# Patient Record
Sex: FEMALE | Race: WHITE | Employment: PART TIME | URBAN - METROPOLITAN AREA
[De-identification: names, ages, dates, MRNs, and addresses within clinical notes are randomized per-mention and may not be internally consistent; named-entity substitution may affect disease eponyms.]

---

## 2017-02-01 ENCOUNTER — OFFICE VISIT (OUTPATIENT)
Dept: FAMILY MEDICINE CLINIC | Age: 27
End: 2017-02-01

## 2017-02-01 VITALS
WEIGHT: 121.4 LBS | SYSTOLIC BLOOD PRESSURE: 118 MMHG | TEMPERATURE: 98.3 F | DIASTOLIC BLOOD PRESSURE: 80 MMHG | HEIGHT: 59 IN | BODY MASS INDEX: 24.48 KG/M2

## 2017-02-01 DIAGNOSIS — J40 BRONCHITIS: Primary | ICD-10-CM

## 2017-02-01 DIAGNOSIS — H61.21 IMPACTED CERUMEN OF RIGHT EAR: ICD-10-CM

## 2017-02-01 PROCEDURE — 99214 OFFICE O/P EST MOD 30 MIN: CPT | Performed by: FAMILY MEDICINE

## 2017-02-01 PROCEDURE — 69210 REMOVE IMPACTED EAR WAX UNI: CPT | Performed by: FAMILY MEDICINE

## 2017-02-01 RX ORDER — METHYLPREDNISOLONE 4 MG/1
TABLET ORAL
Qty: 1 KIT | Refills: 0 | Status: SHIPPED | OUTPATIENT
Start: 2017-02-01 | End: 2017-02-07

## 2017-02-01 RX ORDER — AZITHROMYCIN 250 MG/1
TABLET, FILM COATED ORAL
Qty: 1 PACKET | Refills: 0 | Status: SHIPPED | OUTPATIENT
Start: 2017-02-01 | End: 2017-02-11

## 2017-02-01 ASSESSMENT — ENCOUNTER SYMPTOMS
COUGH: 1
SORE THROAT: 0
HEARTBURN: 0
WHEEZING: 1
RHINORRHEA: 0
HEMOPTYSIS: 0
SHORTNESS OF BREATH: 1

## 2018-03-20 ENCOUNTER — TELEPHONE (OUTPATIENT)
Dept: FAMILY MEDICINE CLINIC | Age: 28
End: 2018-03-20

## 2018-03-22 ENCOUNTER — OFFICE VISIT (OUTPATIENT)
Dept: FAMILY MEDICINE CLINIC | Age: 28
End: 2018-03-22

## 2018-03-22 VITALS
SYSTOLIC BLOOD PRESSURE: 120 MMHG | HEIGHT: 59 IN | BODY MASS INDEX: 24.6 KG/M2 | DIASTOLIC BLOOD PRESSURE: 74 MMHG | WEIGHT: 122 LBS | TEMPERATURE: 98.4 F

## 2018-03-22 DIAGNOSIS — N83.201 CYSTS OF BOTH OVARIES: ICD-10-CM

## 2018-03-22 DIAGNOSIS — N83.202 CYSTS OF BOTH OVARIES: ICD-10-CM

## 2018-03-22 DIAGNOSIS — F41.1 GAD (GENERALIZED ANXIETY DISORDER): ICD-10-CM

## 2018-03-22 DIAGNOSIS — R10.9 ABDOMINAL PAIN, UNSPECIFIED ABDOMINAL LOCATION: Primary | ICD-10-CM

## 2018-03-22 DIAGNOSIS — N94.6 DYSMENORRHEA: ICD-10-CM

## 2018-03-22 LAB
BILIRUBIN, POC: ABNORMAL
BLOOD URINE, POC: ABNORMAL
CLARITY, POC: ABNORMAL
COLOR, POC: YELLOW
GLUCOSE URINE, POC: ABNORMAL
KETONES, POC: ABNORMAL
LEUKOCYTE EST, POC: ABNORMAL
NITRITE, POC: ABNORMAL
PH, POC: 6.5
PROTEIN, POC: ABNORMAL
SPECIFIC GRAVITY, POC: 1.02
UROBILINOGEN, POC: 0.2

## 2018-03-22 PROCEDURE — G8427 DOCREV CUR MEDS BY ELIG CLIN: HCPCS | Performed by: FAMILY MEDICINE

## 2018-03-22 PROCEDURE — 99214 OFFICE O/P EST MOD 30 MIN: CPT | Performed by: FAMILY MEDICINE

## 2018-03-22 PROCEDURE — 1036F TOBACCO NON-USER: CPT | Performed by: FAMILY MEDICINE

## 2018-03-22 PROCEDURE — G8484 FLU IMMUNIZE NO ADMIN: HCPCS | Performed by: FAMILY MEDICINE

## 2018-03-22 PROCEDURE — 81002 URINALYSIS NONAUTO W/O SCOPE: CPT | Performed by: FAMILY MEDICINE

## 2018-03-22 PROCEDURE — G8420 CALC BMI NORM PARAMETERS: HCPCS | Performed by: FAMILY MEDICINE

## 2018-03-22 RX ORDER — PAROXETINE 10 MG/1
10 TABLET, FILM COATED ORAL DAILY
Qty: 30 TABLET | Refills: 3 | Status: SHIPPED | OUTPATIENT
Start: 2018-03-22 | End: 2019-03-01 | Stop reason: SDUPTHER

## 2018-03-22 ASSESSMENT — PATIENT HEALTH QUESTIONNAIRE - PHQ9
SUM OF ALL RESPONSES TO PHQ QUESTIONS 1-9: 2
2. FEELING DOWN, DEPRESSED OR HOPELESS: 1
1. LITTLE INTEREST OR PLEASURE IN DOING THINGS: 1
SUM OF ALL RESPONSES TO PHQ9 QUESTIONS 1 & 2: 2

## 2018-03-22 ASSESSMENT — ENCOUNTER SYMPTOMS
RESPIRATORY NEGATIVE: 1
ABDOMINAL PAIN: 1

## 2018-04-09 ENCOUNTER — HOSPITAL ENCOUNTER (OUTPATIENT)
Dept: ULTRASOUND IMAGING | Age: 28
Discharge: HOME | End: 2018-04-09
Attending: FAMILY MEDICINE | Admitting: FAMILY MEDICINE

## 2018-04-09 DIAGNOSIS — N83.202 CYSTS OF BOTH OVARIES: ICD-10-CM

## 2018-04-09 DIAGNOSIS — N94.6 DYSMENORRHEA: ICD-10-CM

## 2018-04-09 DIAGNOSIS — N83.201 CYSTS OF BOTH OVARIES: ICD-10-CM

## 2019-03-01 ENCOUNTER — TELEPHONE (OUTPATIENT)
Dept: FAMILY MEDICINE CLINIC | Age: 29
End: 2019-03-01

## 2019-03-01 DIAGNOSIS — F41.1 GAD (GENERALIZED ANXIETY DISORDER): ICD-10-CM

## 2019-03-01 RX ORDER — PAROXETINE 10 MG/1
10 TABLET, FILM COATED ORAL DAILY
Qty: 30 TABLET | Refills: 3 | Status: SHIPPED | OUTPATIENT
Start: 2019-03-01 | End: 2019-11-08 | Stop reason: SDUPTHER

## 2019-08-01 ENCOUNTER — OFFICE VISIT (OUTPATIENT)
Dept: FAMILY MEDICINE CLINIC | Age: 29
End: 2019-08-01
Payer: COMMERCIAL

## 2019-08-01 VITALS
BODY MASS INDEX: 27.3 KG/M2 | HEART RATE: 80 BPM | TEMPERATURE: 98.1 F | DIASTOLIC BLOOD PRESSURE: 81 MMHG | SYSTOLIC BLOOD PRESSURE: 117 MMHG | HEIGHT: 59 IN | WEIGHT: 135.4 LBS

## 2019-08-01 DIAGNOSIS — H66.004 RECURRENT ACUTE SUPPURATIVE OTITIS MEDIA OF RIGHT EAR WITHOUT SPONTANEOUS RUPTURE OF TYMPANIC MEMBRANE: Primary | ICD-10-CM

## 2019-08-01 PROCEDURE — 99213 OFFICE O/P EST LOW 20 MIN: CPT | Performed by: FAMILY MEDICINE

## 2019-08-01 PROCEDURE — 1036F TOBACCO NON-USER: CPT | Performed by: FAMILY MEDICINE

## 2019-08-01 PROCEDURE — G8419 CALC BMI OUT NRM PARAM NOF/U: HCPCS | Performed by: FAMILY MEDICINE

## 2019-08-01 PROCEDURE — G8427 DOCREV CUR MEDS BY ELIG CLIN: HCPCS | Performed by: FAMILY MEDICINE

## 2019-08-01 RX ORDER — AMOXICILLIN AND CLAVULANATE POTASSIUM 875; 125 MG/1; MG/1
1 TABLET, FILM COATED ORAL 2 TIMES DAILY
Qty: 14 TABLET | Refills: 0 | Status: SHIPPED | OUTPATIENT
Start: 2019-08-01 | End: 2019-08-08

## 2019-08-01 ASSESSMENT — ENCOUNTER SYMPTOMS
TROUBLE SWALLOWING: 0
SINUS PRESSURE: 0
DIARRHEA: 0
CHEST TIGHTNESS: 0
COUGH: 0
ABDOMINAL PAIN: 0
NAUSEA: 0
SORE THROAT: 0
RHINORRHEA: 0
SHORTNESS OF BREATH: 0
VOMITING: 0
WHEEZING: 0
FACIAL SWELLING: 0

## 2019-11-08 ENCOUNTER — OFFICE VISIT (OUTPATIENT)
Dept: FAMILY MEDICINE CLINIC | Age: 29
End: 2019-11-08
Payer: COMMERCIAL

## 2019-11-08 VITALS
WEIGHT: 137 LBS | OXYGEN SATURATION: 99 % | BODY MASS INDEX: 27.67 KG/M2 | HEART RATE: 78 BPM | SYSTOLIC BLOOD PRESSURE: 110 MMHG | DIASTOLIC BLOOD PRESSURE: 78 MMHG

## 2019-11-08 DIAGNOSIS — F41.1 GAD (GENERALIZED ANXIETY DISORDER): ICD-10-CM

## 2019-11-08 PROCEDURE — G8484 FLU IMMUNIZE NO ADMIN: HCPCS | Performed by: FAMILY MEDICINE

## 2019-11-08 PROCEDURE — G8419 CALC BMI OUT NRM PARAM NOF/U: HCPCS | Performed by: FAMILY MEDICINE

## 2019-11-08 PROCEDURE — 96160 PT-FOCUSED HLTH RISK ASSMT: CPT | Performed by: FAMILY MEDICINE

## 2019-11-08 PROCEDURE — 99213 OFFICE O/P EST LOW 20 MIN: CPT | Performed by: FAMILY MEDICINE

## 2019-11-08 PROCEDURE — G8427 DOCREV CUR MEDS BY ELIG CLIN: HCPCS | Performed by: FAMILY MEDICINE

## 2019-11-08 PROCEDURE — 1036F TOBACCO NON-USER: CPT | Performed by: FAMILY MEDICINE

## 2019-11-08 RX ORDER — TRAZODONE HYDROCHLORIDE 50 MG/1
50 TABLET ORAL NIGHTLY
Qty: 30 TABLET | Refills: 2 | Status: SHIPPED | OUTPATIENT
Start: 2019-11-08 | End: 2020-06-10 | Stop reason: SDUPTHER

## 2019-11-08 RX ORDER — PAROXETINE 10 MG/1
10 TABLET, FILM COATED ORAL DAILY
Qty: 30 TABLET | Refills: 3 | Status: SHIPPED | OUTPATIENT
Start: 2019-11-08 | End: 2020-06-10 | Stop reason: SDUPTHER

## 2019-11-08 ASSESSMENT — COLUMBIA-SUICIDE SEVERITY RATING SCALE - C-SSRS
7. DID THIS OCCUR IN THE LAST THREE MONTHS: OVER A YEAR AGO?
2. HAVE YOU ACTUALLY HAD ANY THOUGHTS OF KILLING YOURSELF?: NO
1. WITHIN THE PAST MONTH, HAVE YOU WISHED YOU WERE DEAD OR WISHED YOU COULD GO TO SLEEP AND NOT WAKE UP?: YES
6. HAVE YOU EVER DONE ANYTHING, STARTED TO DO ANYTHING, OR PREPARED TO DO ANYTHING TO END YOUR LIFE?: YES

## 2019-11-08 ASSESSMENT — ENCOUNTER SYMPTOMS
GASTROINTESTINAL NEGATIVE: 1
RESPIRATORY NEGATIVE: 1

## 2019-11-08 ASSESSMENT — PATIENT HEALTH QUESTIONNAIRE - PHQ9
4. FEELING TIRED OR HAVING LITTLE ENERGY: 3
10. IF YOU CHECKED OFF ANY PROBLEMS, HOW DIFFICULT HAVE THESE PROBLEMS MADE IT FOR YOU TO DO YOUR WORK, TAKE CARE OF THINGS AT HOME, OR GET ALONG WITH OTHER PEOPLE: 2
SUM OF ALL RESPONSES TO PHQ9 QUESTIONS 1 & 2: 6
8. MOVING OR SPEAKING SO SLOWLY THAT OTHER PEOPLE COULD HAVE NOTICED. OR THE OPPOSITE, BEING SO FIGETY OR RESTLESS THAT YOU HAVE BEEN MOVING AROUND A LOT MORE THAN USUAL: 3
3. TROUBLE FALLING OR STAYING ASLEEP: 3
SUM OF ALL RESPONSES TO PHQ QUESTIONS 1-9: 25
7. TROUBLE CONCENTRATING ON THINGS, SUCH AS READING THE NEWSPAPER OR WATCHING TELEVISION: 3
9. THOUGHTS THAT YOU WOULD BE BETTER OFF DEAD, OR OF HURTING YOURSELF: 3
SUM OF ALL RESPONSES TO PHQ QUESTIONS 1-9: 25
6. FEELING BAD ABOUT YOURSELF - OR THAT YOU ARE A FAILURE OR HAVE LET YOURSELF OR YOUR FAMILY DOWN: 1
1. LITTLE INTEREST OR PLEASURE IN DOING THINGS: 3
DEPRESSION UNABLE TO ASSESS: FUNCTIONAL CAPACITY MOTIVATION LIMITS ACCURACY
2. FEELING DOWN, DEPRESSED OR HOPELESS: 3
5. POOR APPETITE OR OVEREATING: 3

## 2020-06-10 ENCOUNTER — VIRTUAL VISIT (OUTPATIENT)
Dept: FAMILY MEDICINE CLINIC | Age: 30
End: 2020-06-10
Payer: COMMERCIAL

## 2020-06-10 PROCEDURE — 99213 OFFICE O/P EST LOW 20 MIN: CPT | Performed by: FAMILY MEDICINE

## 2020-06-10 PROCEDURE — G8428 CUR MEDS NOT DOCUMENT: HCPCS | Performed by: FAMILY MEDICINE

## 2020-06-10 RX ORDER — AMOXICILLIN 500 MG/1
500 CAPSULE ORAL 2 TIMES DAILY
Qty: 14 CAPSULE | Refills: 0 | Status: SHIPPED | OUTPATIENT
Start: 2020-06-10 | End: 2020-06-17

## 2020-06-10 RX ORDER — TRAZODONE HYDROCHLORIDE 50 MG/1
75 TABLET ORAL NIGHTLY
Qty: 45 TABLET | Refills: 2 | Status: SHIPPED | OUTPATIENT
Start: 2020-06-10 | End: 2021-11-23

## 2020-06-10 RX ORDER — PAROXETINE 10 MG/1
10 TABLET, FILM COATED ORAL DAILY
Qty: 30 TABLET | Refills: 5 | Status: SHIPPED | OUTPATIENT
Start: 2020-06-10 | End: 2020-10-09 | Stop reason: SDUPTHER

## 2020-06-10 ASSESSMENT — ENCOUNTER SYMPTOMS
SORE THROAT: 1
GASTROINTESTINAL NEGATIVE: 1
RESPIRATORY NEGATIVE: 1

## 2020-06-10 NOTE — PROGRESS NOTES
Pulse oximetry-     Constitutional: [x] Appears well-developed and well-nourished [x] No apparent distress      [] Abnormal-   Mental status  [x] Alert and awake  [x] Oriented to person/place/time [x]Able to follow commands      Eyes:  EOM    []  Normal  [] Abnormal-  Sclera  []  Normal  [] Abnormal -         Discharge []  None visible  [] Abnormal -    HENT:   [x] Normocephalic, atraumatic. [] Abnormal   [] Mouth/Throat: Mucous membranes are moist.     External Ears [x] Normal  [] Abnormal-     Neck: [] No visualized mass     Pulmonary/Chest: [x] Respiratory effort normal.  [x] No visualized signs of difficulty breathing or respiratory distress        [] Abnormal-      Musculoskeletal:   [] Normal gait with no signs of ataxia         [] Normal range of motion of neck        [] Abnormal-       Neurological:        [x] No Facial Asymmetry (Cranial nerve 7 motor function) (limited exam to video visit)          [x] No gaze palsy        [] Abnormal-         Skin:        [x] No significant exanthematous lesions or discoloration noted on facial skin         [] Abnormal-            Psychiatric:       [x] Normal Affect [x] No Hallucinations        [] Abnormal-     Other pertinent observable physical exam findings-     ASSESSMENT/PLAN:  Assessment/plan;  Diagnoses and all orders for this visit:    Cervical adenopathy  -     amoxicillin (AMOXIL) 500 MG capsule; Take 1 capsule by mouth 2 times daily for 7 days    GUANAKO (generalized anxiety disorder)  -     PARoxetine (PAXIL) 10 MG tablet; Take 1 tablet by mouth daily  -     traZODone (DESYREL) 50 MG tablet; Take 1.5 tablets by mouth nightly      No follow-ups on file. Maverick Bose is a 35 y.o. female being evaluated by a Virtual Visit (video visit) encounter to address concerns as mentioned above. A caregiver was present when appropriate.  Due to this being a TeleHealth encounter ([facility]), evaluation of the following organ systems was

## 2020-06-22 ENCOUNTER — OFFICE VISIT (OUTPATIENT)
Dept: PRIMARY CARE CLINIC | Age: 30
End: 2020-06-22
Payer: COMMERCIAL

## 2020-06-22 VITALS — HEART RATE: 78 BPM | OXYGEN SATURATION: 99 % | TEMPERATURE: 98.4 F

## 2020-06-22 PROCEDURE — 99213 OFFICE O/P EST LOW 20 MIN: CPT | Performed by: INTERNAL MEDICINE

## 2020-06-22 PROCEDURE — 1036F TOBACCO NON-USER: CPT | Performed by: INTERNAL MEDICINE

## 2020-06-22 PROCEDURE — G8419 CALC BMI OUT NRM PARAM NOF/U: HCPCS | Performed by: INTERNAL MEDICINE

## 2020-06-22 PROCEDURE — G8428 CUR MEDS NOT DOCUMENT: HCPCS | Performed by: INTERNAL MEDICINE

## 2020-06-24 LAB
SARS-COV-2: NOT DETECTED
SOURCE: NORMAL

## 2020-09-08 ENCOUNTER — OFFICE VISIT (OUTPATIENT)
Dept: PRIMARY CARE CLINIC | Age: 30
End: 2020-09-08
Payer: COMMERCIAL

## 2020-09-08 PROCEDURE — G8419 CALC BMI OUT NRM PARAM NOF/U: HCPCS | Performed by: NURSE PRACTITIONER

## 2020-09-08 PROCEDURE — G8428 CUR MEDS NOT DOCUMENT: HCPCS | Performed by: NURSE PRACTITIONER

## 2020-09-08 PROCEDURE — 99211 OFF/OP EST MAY X REQ PHY/QHP: CPT | Performed by: NURSE PRACTITIONER

## 2020-09-08 NOTE — PROGRESS NOTES
Lillian Benedict received a viral test for COVID-19. They were educated on isolation and quarantine as appropriate. For any symptoms, they were directed to seek care from their PCP, given contact information to establish with a doctor, directed to an urgent care or the emergency room.

## 2020-09-08 NOTE — PATIENT INSTRUCTIONS
You have received a viral test for COVID-19. Below is education on quarantine per the CDC guidelines. For any symptoms, seek care from your PCP, call [phone] to establish care with a doctor, or go directly to an urgent care or the emergency room. Test results will take 2-7 days and will be sent to you in your [facility] account. If you test positive, you will be contacted via phone. If you test negative, the ONLY communication will be through [address] GO TO [employer] AND SIGN UP FOR [employer] [facility]  (LOWER LEFT OF THE HOME PAGE)  No test is 100%. If you have symptoms, you should follow the guidance of quarantine as previously stated. You can still be contagious if you have symptoms. Your [facility] will reach out to you if you have a positive result. They will provide you with a return to work date and note. If you were tested for a pre-op, then you should remain in quarantine until your procedure. How do I know if I need to be in quarantine? If you live in a community where COVID-19 is or might be spreading (currently, that is virtually everywhere in the [place])  Be alert for symptoms. Watch for fever, cough, shortness of breath, or other symptoms of COVID-19.  Take your temperature if symptoms develop.  Practice social distancing. Maintain 6 feet of distance from others and stay out of crowded places.  Follow CDC guidance if symptoms develop. If you feel healthy but:   Recently had close contact with a person with COVID-19 you need to Quarantine:   Stay home until 14 days after your last exposure.  Check your temperature twice a day and watch for symptoms of COVID-19.  If possible, stay away from people who are at higher-risk for getting very sick from COVID-19.   Stay Home and Monitor Your Health if you:   Have been diagnosed with COVID-19, or   Are waiting for test results, or   Have cough, fever, or shortness of breath, or symptoms of COVID-19      When You Can

## 2020-09-10 ENCOUNTER — PATIENT MESSAGE (OUTPATIENT)
Dept: FAMILY MEDICINE CLINIC | Age: 30
End: 2020-09-10

## 2020-09-10 ENCOUNTER — NURSE TRIAGE (OUTPATIENT)
Dept: OTHER | Facility: CLINIC | Age: 30
End: 2020-09-10

## 2020-09-10 LAB — SARS-COV-2, NAA: NOT DETECTED

## 2020-09-10 RX ORDER — AMOXICILLIN 500 MG/1
500 CAPSULE ORAL 2 TIMES DAILY
Qty: 14 CAPSULE | Refills: 0 | Status: SHIPPED | OUTPATIENT
Start: 2020-09-10 | End: 2020-09-17

## 2020-09-10 NOTE — TELEPHONE ENCOUNTER
Reason for Disposition   Caller has cancelled the call before the first contact    Protocols used: NO CONTACT OR DUPLICATE CONTACT CALL-ADULT-[place]

## 2020-09-10 NOTE — TELEPHONE ENCOUNTER
From: Efraín Nair  To: Rosey Zepeda DO  Sent: 9/10/2020 10:38 AM EDT  Subject: Prescription Question    Good morning,     Tried to get an appointment and none were available until the 22 or 23 of September. My Covid results came back negative. I've had a sore throat since Saturday and was wondering if you could send a prescription in for me so I don't have to wait that long.      Thanks,    Braydon Tobar

## 2020-10-09 RX ORDER — PAROXETINE 10 MG/1
10 TABLET, FILM COATED ORAL DAILY
Qty: 30 TABLET | Refills: 5 | Status: SHIPPED | OUTPATIENT
Start: 2020-10-09 | End: 2021-11-23

## 2021-03-10 ENCOUNTER — TELEPHONE (OUTPATIENT)
Dept: FAMILY MEDICINE CLINIC | Age: 31
End: 2021-03-10

## 2021-03-10 ENCOUNTER — NURSE ONLY (OUTPATIENT)
Dept: FAMILY MEDICINE CLINIC | Age: 31
End: 2021-03-10
Payer: COMMERCIAL

## 2021-03-10 DIAGNOSIS — R30.0 DYSURIA: Primary | ICD-10-CM

## 2021-03-10 LAB
BILIRUBIN, POC: ABNORMAL
BLOOD URINE, POC: ABNORMAL
CLARITY, POC: ABNORMAL
COLOR, POC: ABNORMAL
GLUCOSE URINE, POC: ABNORMAL
KETONES, POC: ABNORMAL
LEUKOCYTE EST, POC: ABNORMAL
NITRITE, POC: ABNORMAL
PH, POC: 6
PROTEIN, POC: ABNORMAL
SPECIFIC GRAVITY, POC: 1.02
UROBILINOGEN, POC: 0.32

## 2021-03-10 PROCEDURE — 81002 URINALYSIS NONAUTO W/O SCOPE: CPT | Performed by: FAMILY MEDICINE

## 2021-03-10 RX ORDER — CIPROFLOXACIN 500 MG/1
500 TABLET, FILM COATED ORAL 2 TIMES DAILY
Qty: 10 TABLET | Refills: 0 | Status: SHIPPED | OUTPATIENT
Start: 2021-03-10 | End: 2021-03-15

## 2021-03-10 NOTE — TELEPHONE ENCOUNTER
Patient is calling stating she thinks she may have a UTI she states she having frequent urination and a little pain while urinating.  Symptoms started this morning can she come in today around 4:00 for a urine specimen please advise

## 2021-03-13 LAB
ORGANISM: ABNORMAL
URINE CULTURE, ROUTINE: ABNORMAL

## 2021-11-02 ENCOUNTER — NURSE ONLY (OUTPATIENT)
Dept: FAMILY MEDICINE CLINIC | Age: 31
End: 2021-11-02
Payer: COMMERCIAL

## 2021-11-02 ENCOUNTER — TELEPHONE (OUTPATIENT)
Dept: FAMILY MEDICINE CLINIC | Age: 31
End: 2021-11-02

## 2021-11-02 DIAGNOSIS — R39.9 UTI SYMPTOMS: Primary | ICD-10-CM

## 2021-11-02 LAB
BILIRUBIN, POC: NEGATIVE
BLOOD URINE, POC: NORMAL
CLARITY, POC: NORMAL
COLOR, POC: YELLOW
GLUCOSE URINE, POC: NEGATIVE
KETONES, POC: NEGATIVE
LEUKOCYTE EST, POC: NEGATIVE
NITRITE, POC: NEGATIVE
PH, POC: 7.5
PROTEIN, POC: NEGATIVE
SPECIFIC GRAVITY, POC: 1.02
UROBILINOGEN, POC: 0.2

## 2021-11-02 PROCEDURE — 81002 URINALYSIS NONAUTO W/O SCOPE: CPT | Performed by: FAMILY MEDICINE

## 2021-11-02 NOTE — TELEPHONE ENCOUNTER
Pt came in  on wrong day for her appt that was scheduled on Nov 9. Pt left a  Urine sample  today.   Left her  appt on the schedule for Nov 9th for a follow up.      [phone]

## 2021-11-03 LAB — URINE CULTURE, ROUTINE: NORMAL

## 2021-11-03 RX ORDER — NITROFURANTOIN 25; 75 MG/1; MG/1
100 CAPSULE ORAL 2 TIMES DAILY
Qty: 10 CAPSULE | Refills: 0 | Status: SHIPPED | OUTPATIENT
Start: 2021-11-03 | End: 2021-11-08

## 2021-11-11 LAB
HPV COMMENT: NORMAL
HPV TYPE 16: NOT DETECTED
HPV TYPE 18: NOT DETECTED
HPVOH (OTHER TYPES): NOT DETECTED

## 2021-11-23 ENCOUNTER — APPOINTMENT (OUTPATIENT)
Dept: GENERAL RADIOLOGY | Age: 31
End: 2021-11-23
Payer: COMMERCIAL

## 2021-11-23 ENCOUNTER — HOSPITAL ENCOUNTER (EMERGENCY)
Age: 31
Discharge: HOME | End: 2021-11-23
Payer: COMMERCIAL

## 2021-11-23 VITALS
TEMPERATURE: 98.5 F | HEART RATE: 66 BPM | BODY MASS INDEX: 30.24 KG/M2 | HEIGHT: 59 IN | RESPIRATION RATE: 14 BRPM | OXYGEN SATURATION: 98 % | WEIGHT: 150 LBS | SYSTOLIC BLOOD PRESSURE: 112 MMHG | DIASTOLIC BLOOD PRESSURE: 77 MMHG

## 2021-11-23 DIAGNOSIS — S63.652A SPRAIN OF METACARPOPHALANGEAL (MCP) JOINT OF RIGHT MIDDLE FINGER, INITIAL ENCOUNTER: Primary | ICD-10-CM

## 2021-11-23 PROCEDURE — 73130 X-RAY EXAM OF HAND: CPT

## 2021-11-23 PROCEDURE — 99283 EMERGENCY DEPT VISIT LOW MDM: CPT

## 2021-11-23 ASSESSMENT — ENCOUNTER SYMPTOMS
VOMITING: 0
NAUSEA: 0

## 2021-11-23 ASSESSMENT — PAIN SCALES - GENERAL: PAINLEVEL_OUTOF10: 4

## 2021-11-23 NOTE — ED PROVIDER NOTES
[facility]        Pt Name: Rick Wong  MRN: [id number]  [place] 1990  Date of evaluation: 11/23/2021  Provider: Ernesto Montero PA-C  PCP: Neita Meckel, DO  Note Started: 3:28 PM EST       YON. I have evaluated this patient. My supervising physician was available for consultation. CHIEF COMPLAINT       Chief Complaint   Patient presents with    Finger Pain     Pt reports right middle finger pain x 1 week, no injury. HISTORY OF PRESENT ILLNESS   (Location, Timing/Onset, Context/Setting, Quality, Duration, Modifying Factors, Severity, Associated Signs and Symptoms)  Note limiting factors. Chief Complaint: Right hand pain    Rick Wong is a 28 y.o. female who presents to the emergency department today for evaluation for right hand pain. The patient states that she has been experiencing pain to her right hand, mostly her right middle finger, and she states that this has been present for 1 week. The patient states that she first noticed this 1 week ago while trying to open a jar of jalapenos. The patient denies falling or injuring herself in any way. She states that she does work as a /, is unsure if this could be contributing as well. The patient is currently rating her discomfort as a 4/10, her pain is worse with touch and certain movements. She is right-handed. She has no numbness, tingling or weakness. She has no fever chills. No nausea or vomiting, no other complaints. Nursing Notes were all reviewed and agreed with or any disagreements were addressed in the HPI. REVIEW OF SYSTEMS    (2-9 systems for level 4, 10 or more for level 5)     Review of Systems   Constitutional: Negative for activity change, appetite change and fever. Gastrointestinal: Negative for nausea and vomiting. Musculoskeletal: Positive for arthralgias. Skin: Negative for wound.    Neurological: Negative for weakness and numbness. Positives and Pertinent negatives as per HPI. Except as noted above in the ROS, all other systems were reviewed and negative. PAST MEDICAL HISTORY     Past Medical History:   Diagnosis Date    Asthma     used inhaler 10/25/10    Rh incompatibility          SURGICAL HISTORY     Past Surgical History:   Procedure Laterality Date    APPENDECTOMY  1996    TONSILLECTOMY  2008         [address]       Discharge Medication List as of 11/23/2021  4:19 PM      CONTINUE these medications which have NOT CHANGED    Details   ibuprofen (IBU) 800 MG tablet Take 1 tablet by mouth every 8 hours as needed for Pain., Disp-40 tablet, R-0               ALLERGIES     Patient has no known allergies. FAMILYHISTORY       Family History   Problem Relation Age of Onset    Asthma Mother     Sickle Cell Trait Daughter     Sickle Cell Trait Son     Asthma Maternal Grandmother           SOCIAL HISTORY       Social History     Tobacco Use    Smoking status: Never Smoker    Smokeless tobacco: Never Used    Tobacco comment: encouraged to never smoke   Substance Use Topics    Alcohol use: No     Alcohol/week: 0.0 standard drinks    Drug use: No       SCREENINGS             PHYSICAL EXAM    (up to 7 for level 4, 8 or more for level 5)     ED Triage Vitals   BP Temp Temp src Pulse Resp SpO2 Height Weight   11/23/21 1500 11/23/21 1500 -- 11/23/21 1500 11/23/21 1500 11/23/21 1500 11/23/21 1456 11/23/21 1456   112/77 98.5 °F (36.9 °C)  66 14 98 % 4' 11\" (1.499 m) 150 lb (68 kg)       Physical Exam  Vitals and nursing note reviewed. Constitutional:       Appearance: She is well-developed. She is not diaphoretic. HENT:      Head: Normocephalic and atraumatic. Right Ear: External ear normal.      Left Ear: External ear normal.      Nose: Nose normal.   Eyes:      General:         Right eye: No discharge. Left eye: No discharge. Neck:      Trachea: No tracheal deviation. Cardiovascular:      Pulses: Normal pulses. Pulmonary:      Effort: Pulmonary effort is normal. No respiratory distress. Musculoskeletal:         General: Normal range of motion. Cervical back: Normal range of motion and neck supple. Comments: There is tenderness palpation noted over the dorsum of the right hand, increasing over the right third metacarpal, extending to the proximal phalanx. There is no overlying erythema, edema, ecchymosis or warmth. Radial pulses 2+. Normal sensation to light touch. Neurovascularly intact. Full range of motion of all joints. Skin:     General: Skin is warm and dry. Neurological:      Mental Status: She is alert and oriented to person, place, and time. Psychiatric:         Behavior: Behavior normal.         DIAGNOSTIC RESULTS   LABS:    Labs Reviewed - No data to display    When ordered only abnormal lab results are displayed. All other labs were within normal range or not returned as of this dictation. EKG: When ordered, EKG's are interpreted by the Emergency Department Physician in the absence of a cardiologist.  Please see their note for interpretation of EKG. RADIOLOGY:   Non-plain film images such as CT, Ultrasound and MRI are read by the radiologist. Plain radiographic images are visualized and preliminarily interpreted by the ED Provider with the below findings:        Interpretation per the Radiologist below, if available at the time of this note:    XR HAND RIGHT (MIN 3 VIEWS)   Final Result   No acute osseous abnormality of the right hand. No results found.         PROCEDURES   Unless otherwise noted below, none     Procedures    CRITICAL CARE TIME   N/A    CONSULTS:  None      EMERGENCY DEPARTMENT COURSE and DIFFERENTIAL DIAGNOSIS/MDM:   Vitals:    Vitals:    11/23/21 1456 11/23/21 1500   BP:  112/77   Pulse:  66   Resp:  14   Temp:  98.5 °F (36.9 °C)   SpO2:  98%   Weight: 150 lb (68 kg)    Height: 4' 11\" (1.499 m)        Patient was given the following medications:  Medications - No data to display        Briefly, this is a 21-year-old female who presents emergency department today for evaluation for right hand pain, and swelling. Patient states that he first noticed this 1 week ago while opening a jar of jalapenos. He does work as a / and is unsure if this could be the source of her pain    On examination she does have tenderness under the dorsum of the right hand, increasing over the right third metacarpal extending to the proximal phalanx she is otherwise neurovascularly intact    X-rays negative for any acute process. I feel that clinically the patient symptoms today likely due to sprain/strain, supportive care discussed at home, recommend follow-up with her primary care physician within 1 week if there is no improvement. She is to return to the ED for any new or worsening symptoms, the patient voiced understanding and is agreeable with plan. Stable for discharge. No results found for this visit on 11/23/21. I estimate there is LOW risk for COMPARTMENT SYNDROME, DEEP VENOUS THROMBOSIS, SEPTIC ARTHRITIS, TENDON OR NEUROVASCULAR INJURY, thus I consider the discharge disposition reasonable. Lexi Irwin and I have discussed the diagnosis and risks, and we agree with discharging home to follow-up with their primary doctor or the referral orthopedist. We also discussed returning to the Emergency Department immediately if new or worsening symptoms occur. We have discussed the symptoms which are most concerning (e.g., changing or worsening pain, numbness, weakness) that necessitate immediate return. FINAL IMPRESSION      1.  Sprain of metacarpophalangeal (MCP) joint of right middle finger, initial encounter          DISPOSITION/PLAN   DISPOSITION Decision To Discharge 11/23/2021 04:17:58 PM      PATIENT REFERRED TO:  Brandi Ingram DO  [address] [place] [id number]  [phone]    Schedule an appointment as soon as possible for a visit in 1 week      [facility] Emergency Department  [address] [facility]  [phone]    As needed, If symptoms worsen      DISCHARGE MEDICATIONS:  Discharge Medication List as of 11/23/2021  4:19 PM          DISCONTINUED MEDICATIONS:  Discharge Medication List as of 11/23/2021  4:19 PM      STOP taking these medications       PARoxetine (PAXIL) 10 MG tablet Comments:   Reason for Stopping:         traZODone (DESYREL) 50 MG tablet Comments:   Reason for Stopping:                      (Please note that portions of this note were completed with a voice recognition program.  Efforts were made to edit the dictations but occasionally words are mis-transcribed.)    Jocelyne Muhammad PA-C (electronically signed)            Jocelyne Muhammad PA-C  11/23/21 4623

## 2022-06-09 ENCOUNTER — E-VISIT (OUTPATIENT)
Dept: FAMILY MEDICINE CLINIC | Age: 32
End: 2022-06-09

## 2022-06-09 ENCOUNTER — NURSE ONLY (OUTPATIENT)
Dept: FAMILY MEDICINE CLINIC | Age: 32
End: 2022-06-09
Payer: COMMERCIAL

## 2022-06-09 DIAGNOSIS — J02.0 ACUTE STREPTOCOCCAL PHARYNGITIS: Primary | ICD-10-CM

## 2022-06-09 DIAGNOSIS — J02.9 SORE THROAT: ICD-10-CM

## 2022-06-09 DIAGNOSIS — U09.9 MULTIPLE PERSISTENT SYMPTOMS AFTER COVID-19: Primary | ICD-10-CM

## 2022-06-09 DIAGNOSIS — R68.89 FLU-LIKE SYMPTOMS: ICD-10-CM

## 2022-06-09 LAB
INFLUENZA A ANTIBODY: NEGATIVE
INFLUENZA B ANTIBODY: NEGATIVE
S PYO AG THROAT QL: NORMAL

## 2022-06-09 PROCEDURE — 87804 INFLUENZA ASSAY W/OPTIC: CPT | Performed by: FAMILY MEDICINE

## 2022-06-09 PROCEDURE — 87880 STREP A ASSAY W/OPTIC: CPT | Performed by: FAMILY MEDICINE

## 2022-06-09 PROCEDURE — 99421 OL DIG E/M SVC 5-10 MIN: CPT | Performed by: FAMILY MEDICINE

## 2022-06-09 RX ORDER — AMOXICILLIN 500 MG/1
500 CAPSULE ORAL 3 TIMES DAILY
Qty: 21 CAPSULE | Refills: 0 | Status: SHIPPED | OUTPATIENT
Start: 2022-06-09 | End: 2022-06-16

## 2022-06-09 ASSESSMENT — LIFESTYLE VARIABLES: SMOKING_STATUS: NO, I'VE NEVER SMOKED

## 2022-06-10 LAB — SARS-COV-2: NOT DETECTED

## 2024-04-18 ENCOUNTER — APPOINTMENT (OUTPATIENT)
Dept: CT IMAGING | Age: 34
End: 2024-04-18
Payer: COMMERCIAL

## 2024-04-18 ENCOUNTER — APPOINTMENT (OUTPATIENT)
Dept: GENERAL RADIOLOGY | Age: 34
End: 2024-04-18
Payer: COMMERCIAL

## 2024-04-18 ENCOUNTER — TELEPHONE (OUTPATIENT)
Dept: FAMILY MEDICINE CLINIC | Age: 34
End: 2024-04-18

## 2024-04-18 ENCOUNTER — HOSPITAL ENCOUNTER (EMERGENCY)
Age: 34
Discharge: HOME | End: 2024-04-18
Attending: EMERGENCY MEDICINE
Payer: COMMERCIAL

## 2024-04-18 VITALS
OXYGEN SATURATION: 99 % | BODY MASS INDEX: 30.24 KG/M2 | HEIGHT: 59 IN | WEIGHT: 150 LBS | DIASTOLIC BLOOD PRESSURE: 73 MMHG | TEMPERATURE: 99.1 F | RESPIRATION RATE: 16 BRPM | HEART RATE: 88 BPM | SYSTOLIC BLOOD PRESSURE: 101 MMHG

## 2024-04-18 DIAGNOSIS — S39.012A STRAIN OF LUMBAR REGION, INITIAL ENCOUNTER: Primary | ICD-10-CM

## 2024-04-18 LAB
ALBUMIN SERPL-MCNC: 4.1 G/DL (ref 3.4–5)
ALBUMIN/GLOB SERPL: 1.4 {RATIO} (ref 1.1–2.2)
ALP SERPL-CCNC: 54 U/L (ref 40–129)
ALT SERPL-CCNC: 19 U/L (ref 10–40)
ANION GAP SERPL CALCULATED.3IONS-SCNC: 11 MMOL/L (ref 3–16)
AST SERPL-CCNC: 21 U/L (ref 15–37)
B-HCG SERPL EIA 3RD IS-ACNC: <5 MIU/ML
BACTERIA URNS QL MICRO: ABNORMAL /HPF
BASOPHILS # BLD: 0.1 K/UL (ref 0–0.2)
BASOPHILS NFR BLD: 0.6 %
BILIRUB SERPL-MCNC: 0.3 MG/DL (ref 0–1)
BILIRUB UR QL STRIP.AUTO: NEGATIVE
BUN SERPL-MCNC: 14 MG/DL (ref 7–20)
CALCIUM SERPL-MCNC: 9.1 MG/DL (ref 8.3–10.6)
CHLORIDE SERPL-SCNC: 103 MMOL/L (ref 99–110)
CLARITY UR: CLEAR
CO2 SERPL-SCNC: 23 MMOL/L (ref 21–32)
COLOR UR: YELLOW
CREAT SERPL-MCNC: 0.9 MG/DL (ref 0.6–1.1)
D DIMER: 0.42 UG/ML FEU (ref 0–0.6)
DEPRECATED RDW RBC AUTO: 12.5 % (ref 12.4–15.4)
EOSINOPHIL # BLD: 0.1 K/UL (ref 0–0.6)
EOSINOPHIL NFR BLD: 0.4 %
EPI CELLS #/AREA URNS AUTO: 6 /HPF (ref 0–5)
GFR SERPLBLD CREATININE-BSD FMLA CKD-EPI: 87 ML/MIN/{1.73_M2}
GLUCOSE SERPL-MCNC: 159 MG/DL (ref 70–99)
GLUCOSE UR STRIP.AUTO-MCNC: NEGATIVE MG/DL
HCT VFR BLD AUTO: 37.1 % (ref 36–48)
HGB BLD-MCNC: 12.2 G/DL (ref 12–16)
HGB UR QL STRIP.AUTO: NEGATIVE
HYALINE CASTS #/AREA URNS AUTO: 0 /LPF (ref 0–8)
KETONES UR STRIP.AUTO-MCNC: NEGATIVE MG/DL
LEUKOCYTE ESTERASE UR QL STRIP.AUTO: ABNORMAL
LIPASE SERPL-CCNC: 22 U/L (ref 13–60)
LYMPHOCYTES # BLD: 1.9 K/UL (ref 1–5.1)
LYMPHOCYTES NFR BLD: 12.2 %
MCH RBC QN AUTO: 29.3 PG (ref 26–34)
MCHC RBC AUTO-ENTMCNC: 32.8 G/DL (ref 31–36)
MCV RBC AUTO: 89.4 FL (ref 80–100)
MONOCYTES # BLD: 1.1 K/UL (ref 0–1.3)
MONOCYTES NFR BLD: 6.9 %
NEUTROPHILS # BLD: 12.7 K/UL (ref 1.7–7.7)
NEUTROPHILS NFR BLD: 79.9 %
NITRITE UR QL STRIP.AUTO: NEGATIVE
PH UR STRIP.AUTO: 6.5 [PH] (ref 5–8)
PLATELET # BLD AUTO: 284 K/UL (ref 135–450)
PMV BLD AUTO: 9.2 FL (ref 5–10.5)
POTASSIUM SERPL-SCNC: 3.6 MMOL/L (ref 3.5–5.1)
PROT SERPL-MCNC: 7 G/DL (ref 6.4–8.2)
PROT UR STRIP.AUTO-MCNC: NEGATIVE MG/DL
RBC # BLD AUTO: 4.15 M/UL (ref 4–5.2)
RBC CLUMPS #/AREA URNS AUTO: 4 /HPF (ref 0–4)
REASON FOR REJECTION: NORMAL
REJECTED TEST: NORMAL
SODIUM SERPL-SCNC: 137 MMOL/L (ref 136–145)
SP GR UR STRIP.AUTO: >=1.03 (ref 1–1.03)
TROPONIN, HIGH SENSITIVITY: 7 NG/L (ref 0–14)
TROPONIN, HIGH SENSITIVITY: 7 NG/L (ref 0–14)
UA COMPLETE W REFLEX CULTURE PNL UR: ABNORMAL
UA DIPSTICK W REFLEX MICRO PNL UR: YES
URN SPEC COLLECT METH UR: ABNORMAL
UROBILINOGEN UR STRIP-ACNC: 0.2 E.U./DL
WBC # BLD AUTO: 15.9 K/UL (ref 4–11)
WBC #/AREA URNS AUTO: 7 /HPF (ref 0–5)

## 2024-04-18 PROCEDURE — 83690 ASSAY OF LIPASE: CPT

## 2024-04-18 PROCEDURE — 96372 THER/PROPH/DIAG INJ SC/IM: CPT

## 2024-04-18 PROCEDURE — 6370000000 HC RX 637 (ALT 250 FOR IP): Performed by: EMERGENCY MEDICINE

## 2024-04-18 PROCEDURE — 6360000002 HC RX W HCPCS: Performed by: EMERGENCY MEDICINE

## 2024-04-18 PROCEDURE — 2580000003 HC RX 258: Performed by: EMERGENCY MEDICINE

## 2024-04-18 PROCEDURE — 71260 CT THORAX DX C+: CPT

## 2024-04-18 PROCEDURE — 74177 CT ABD & PELVIS W/CONTRAST: CPT

## 2024-04-18 PROCEDURE — 80053 COMPREHEN METABOLIC PANEL: CPT

## 2024-04-18 PROCEDURE — 85025 COMPLETE CBC W/AUTO DIFF WBC: CPT

## 2024-04-18 PROCEDURE — 81001 URINALYSIS AUTO W/SCOPE: CPT

## 2024-04-18 PROCEDURE — 71046 X-RAY EXAM CHEST 2 VIEWS: CPT

## 2024-04-18 PROCEDURE — 85379 FIBRIN DEGRADATION QUANT: CPT

## 2024-04-18 PROCEDURE — 6360000004 HC RX CONTRAST MEDICATION: Performed by: EMERGENCY MEDICINE

## 2024-04-18 PROCEDURE — 93005 ELECTROCARDIOGRAM TRACING: CPT | Performed by: EMERGENCY MEDICINE

## 2024-04-18 PROCEDURE — 99285 EMERGENCY DEPT VISIT HI MDM: CPT

## 2024-04-18 PROCEDURE — 84484 ASSAY OF TROPONIN QUANT: CPT

## 2024-04-18 PROCEDURE — 84702 CHORIONIC GONADOTROPIN TEST: CPT

## 2024-04-18 RX ORDER — NAPROXEN 500 MG/1
500 TABLET ORAL 2 TIMES DAILY WITH MEALS
Qty: 60 TABLET | Refills: 5 | Status: SHIPPED | OUTPATIENT
Start: 2024-04-18

## 2024-04-18 RX ORDER — NAPROXEN 250 MG/1
500 TABLET ORAL ONCE
Status: COMPLETED | OUTPATIENT
Start: 2024-04-18 | End: 2024-04-18

## 2024-04-18 RX ORDER — ALBUTEROL SULFATE 90 UG/1
2 AEROSOL, METERED RESPIRATORY (INHALATION) 4 TIMES DAILY PRN
Qty: 18 G | Refills: 0 | Status: SHIPPED | OUTPATIENT
Start: 2024-04-18

## 2024-04-18 RX ORDER — ORPHENADRINE CITRATE 30 MG/ML
60 INJECTION INTRAMUSCULAR; INTRAVENOUS ONCE
Status: COMPLETED | OUTPATIENT
Start: 2024-04-18 | End: 2024-04-18

## 2024-04-18 RX ORDER — LIDOCAINE 4 G/G
1 PATCH TOPICAL ONCE
Status: DISCONTINUED | OUTPATIENT
Start: 2024-04-18 | End: 2024-04-18 | Stop reason: HOSPADM

## 2024-04-18 RX ORDER — TIZANIDINE 4 MG/1
4 TABLET ORAL EVERY 6 HOURS PRN
Qty: 20 TABLET | Refills: 0 | Status: SHIPPED | OUTPATIENT
Start: 2024-04-18

## 2024-04-18 RX ORDER — ONDANSETRON 4 MG/1
4 TABLET, FILM COATED ORAL EVERY 8 HOURS PRN
Qty: 20 TABLET | Refills: 0 | Status: SHIPPED | OUTPATIENT
Start: 2024-04-18

## 2024-04-18 RX ORDER — LORATADINE 10 MG/1
10 TABLET ORAL DAILY
Qty: 30 TABLET | Refills: 0 | Status: SHIPPED | OUTPATIENT
Start: 2024-04-18

## 2024-04-18 RX ORDER — 0.9 % SODIUM CHLORIDE 0.9 %
1000 INTRAVENOUS SOLUTION INTRAVENOUS ONCE
Status: COMPLETED | OUTPATIENT
Start: 2024-04-18 | End: 2024-04-18

## 2024-04-18 RX ADMIN — SODIUM CHLORIDE 1000 ML: 9 INJECTION, SOLUTION INTRAVENOUS at 03:44

## 2024-04-18 RX ADMIN — ORPHENADRINE CITRATE 60 MG: 60 INJECTION INTRAMUSCULAR; INTRAVENOUS at 02:34

## 2024-04-18 RX ADMIN — IOPAMIDOL 75 ML: 755 INJECTION, SOLUTION INTRAVENOUS at 05:19

## 2024-04-18 RX ADMIN — NAPROXEN 500 MG: 250 TABLET ORAL at 02:36

## 2024-04-18 ASSESSMENT — PAIN - FUNCTIONAL ASSESSMENT
PAIN_FUNCTIONAL_ASSESSMENT: 0-10
PAIN_FUNCTIONAL_ASSESSMENT: 0-10

## 2024-04-18 ASSESSMENT — PAIN SCALES - GENERAL
PAINLEVEL_OUTOF10: 4
PAINLEVEL_OUTOF10: 2
PAINLEVEL_OUTOF10: 10

## 2024-04-18 ASSESSMENT — LIFESTYLE VARIABLES
HOW MANY STANDARD DRINKS CONTAINING ALCOHOL DO YOU HAVE ON A TYPICAL DAY: PATIENT DOES NOT DRINK
HOW OFTEN DO YOU HAVE A DRINK CONTAINING ALCOHOL: NEVER

## 2024-04-18 NOTE — TELEPHONE ENCOUNTER
----- Message from Joleen Pierson deandrela sent at 4/18/2024 11:07 AM EDT -----  Regarding: ECC Escalation To Practice  ECC Escalation To Practice      Type of Escalation: STAT Lab Results  --------------------------------------------------------------------------------------------------------------------------    Information for Provider:  Patient is looking for appointment for: Symptom Pt went to the ER earlier this morning and she has a high weight blood count which is 15.9 and there might an infection happen and pt has difficulty breathing, back and chest pain when she goes to the ER earlier this morning. Pt need to have an appt as soon as possible.   Reasons for Message: Unable to reach practice     Additional Information Pt need to have an appt ASAP  --------------------------------------------------------------------------------------------------------------------------    Relationship to Patient: Self     Call Back Info: OK to leave message on voicemail  Preferred Call Back Number: Phone [phone] (home)

## 2024-04-18 NOTE — DISCHARGE INSTR - COC
Continuity of Care Form    Patient Name: Ayanna Calzada   :  1990  MRN:  [id number]    Admit date:  2024  Discharge date:  ***    Code Status Order: Prior   Advance Directives:     Admitting Physician:  No admitting provider for patient encounter.  PCP: Jamin Bright DO    Discharging Nurse: ***  Discharging Hospital Unit/Room#: ED-0011/11  Discharging Unit Phone Number: ***    Emergency Contact:   Extended Emergency Contact Information  Primary Emergency Contact: Dana Contreras  Address: [address] [place] [address]            [place] [address] [facility]  Home Phone: [phone]  Work Phone: [phone]  Mobile Phone: [phone]  Relation: Parent  Secondary Emergency Contact: Queenie Tinsley  Home Phone: [phone]  Relation: Other    Past Surgical History:  Past Surgical History:   Procedure Laterality Date    APPENDECTOMY      TONSILLECTOMY         Immunization History:   Immunization History   Administered Date(s) Administered    Rho (D) Immune Globulin 2013       Active Problems:  Patient Active Problem List   Diagnosis Code    Bacterial vaginosis N76.0, B96.89    Active labor [id number]       Isolation/Infection:   Isolation            No Isolation          Patient Infection Status       None to display                     Nurse Assessment:  Last Vital Signs: /73   Pulse 88   Temp 99.1 °F (37.3 °C)   Resp 16   Ht 1.499 m (4' 11\")   Wt 68 kg (150 lb)   SpO2 99%   BMI 30.30 kg/m²     Last documented pain score (0-10 scale): Pain Level: 2  Last Weight:   Wt Readings from Last 1 Encounters:   24 68 kg (150 lb)     Mental Status:  {IP PT MENTAL STATUS:}    IV Access:  { GIDEON IV ACCESS:386750289}    Nursing Mobility/ADLs:  Walking   {CHP DME ADLs:171290704}  Transfer  {CHP DME ADLs:609139906}  Bathing  {CHP DME ADLs:468049165}  Dressing  {CHP DME ADLs:582468677}  Toileting  {CHP DME ADLs:447060839}  Feeding  {CHP DME ADLs:148734132}  Med Admin  {CHP DME

## 2024-04-18 NOTE — ED NOTES
Patient ambulated to bathroom without difficulty. Urine sample obtained. Patient states she is feeling back to normal.

## 2024-04-18 NOTE — ED PROVIDER NOTES
[facility] EMERGENCY DEPARTMENT  EMERGENCY DEPARTMENT ENCOUNTER        Pt Name: Ayanna Calzada  MRN: [id number]  Birthdate 1990  Date of evaluation: 4/18/2024  Provider: Jerardo Talbert MD  PCP: Jamin Bright DO  Note Started: 3:48 AM EDT 4/18/24    CHIEF COMPLAINT       Chief Complaint   Patient presents with    Back Pain     Pt came in from home, pt reports back spams that start in the back and radiate to the front and making it hard to breath and causing chest pain.       HISTORY OF PRESENT ILLNESS: 1 or more Elements   History From: Patient        Ayanna Calzada is a 34 y.o. female who presents the emergency room for evaluation of back pain and chest pain.  Patient reports having intermittent episodes of pain in her back which describes a back spasm.  She reports pain on the right side lower thoracic region.  She states been ongoing for the past couple days and has been intermittent.  She states that now she is having pain that seems to radiate into her chest.  It is worse with deep breaths and movements.  Denies cough fevers recent overexertion or new exercise regimen.  She stated that she has had similar symptoms in the past but not as intense.  She denies recent travel or time spent immobilized.  She denies pain or swelling to lower extremities.     Nursing Notes were all reviewed and agreed with or any disagreements were addressed in the HPI.    REVIEW OF SYSTEMS :      Review of Systems    Positives and Pertinent negatives as per HPI.     SURGICAL HISTORY     Past Surgical History:   Procedure Laterality Date    APPENDECTOMY  1996    TONSILLECTOMY  2008       CURRENTMEDICATIONS       Previous Medications    IBUPROFEN (IBU) 800 MG TABLET    Take 1 tablet by mouth every 8 hours as needed for Pain.       ALLERGIES     Patient has no known allergies.    FAMILYHISTORY       Family History   Problem Relation Age of Onset    Asthma Mother     Sickle Cell Trait Daughter     Sickle Cell  the chest and abdomen did not demonstrate any emergent abnormalities.  Patient's vital signs normalized and she was ambulatory in the emergency room without difficulties.  She appeared to be in no acute distress.  Discussed with patient and family and they are amenable discharge home with outpatient follow-up.       CONSULTS: (Who and What was discussed)  None          Chronic Conditions:   Past Medical History:   Diagnosis Date    Asthma     used inhaler 10/25/10    Rh incompatibility          Records Reviewed (External and source): Reviewed patient's most recent PCP note    Disposition Considerations (include 1 Tests not done, Admit vs D/C, Shared Decision Making, Pt Expectation of Test or Tx.): Consider getting COVID or flu testing does not change patient management disposition.     Admission to the hospital considered but patient's symptoms are improving.  Vital signs and testing performed is reassuring.  Based on this patient is appropriate for outpatient management.  No indication for admission at this time.     Symptomatic treatment with expectant management discussed with the patient and/or family member or surrogates present and they are amenable to treatment plan and outpatient follow-up.  Strict return precautions were discussed with the patient and those present.  All parties involved were informed that condition may persist or worsen in which case they may then require inpatient treatment, currently there is no indication.  They demonstrated understanding of when to return to the emergency department for new or worsening symptoms.      I am the Primary Clinician of Record.    FINAL IMPRESSION      1. Strain of lumbar region, initial encounter          DISPOSITION/PLAN     DISPOSITION        PATIENT REFERRED TO:  No follow-up provider specified.    DISCHARGE MEDICATIONS:  New Prescriptions    No medications on file       DISCONTINUED MEDICATIONS:  Discontinued Medications    No medications on file

## 2024-04-19 LAB
EKG ATRIAL RATE: 89 BPM
EKG DIAGNOSIS: NORMAL
EKG P AXIS: 40 DEGREES
EKG P-R INTERVAL: 144 MS
EKG Q-T INTERVAL: 360 MS
EKG QRS DURATION: 84 MS
EKG QTC CALCULATION (BAZETT): 438 MS
EKG R AXIS: 83 DEGREES
EKG T AXIS: 25 DEGREES
EKG VENTRICULAR RATE: 89 BPM

## 2024-04-19 PROCEDURE — 93010 ELECTROCARDIOGRAM REPORT: CPT | Performed by: INTERNAL MEDICINE

## 2024-04-19 NOTE — ED NOTES
4/19/24  Pt contacted re: ED visit 4/18/24; \"doing a little better:, advised to return if any problems/concerns.

## 2024-04-22 ENCOUNTER — OFFICE VISIT (OUTPATIENT)
Dept: FAMILY MEDICINE CLINIC | Age: 34
End: 2024-04-22
Payer: COMMERCIAL

## 2024-04-22 VITALS
WEIGHT: 151.6 LBS | DIASTOLIC BLOOD PRESSURE: 80 MMHG | BODY MASS INDEX: 30.56 KG/M2 | SYSTOLIC BLOOD PRESSURE: 126 MMHG | HEIGHT: 59 IN

## 2024-04-22 DIAGNOSIS — D72.829 LEUKOCYTOSIS, UNSPECIFIED TYPE: ICD-10-CM

## 2024-04-22 DIAGNOSIS — M99.08 RIB CAGE DYSFUNCTION: Primary | ICD-10-CM

## 2024-04-22 LAB
DEPRECATED RDW RBC AUTO: 12.4 % (ref 12.4–15.4)
HCT VFR BLD AUTO: 38.5 % (ref 36–48)
HGB BLD-MCNC: 13.2 G/DL (ref 12–16)
MCH RBC QN AUTO: 30 PG (ref 26–34)
MCHC RBC AUTO-ENTMCNC: 34.4 G/DL (ref 31–36)
MCV RBC AUTO: 87.2 FL (ref 80–100)
PLATELET # BLD AUTO: 346 K/UL (ref 135–450)
PMV BLD AUTO: 9.8 FL (ref 5–10.5)
RBC # BLD AUTO: 4.41 M/UL (ref 4–5.2)
WBC # BLD AUTO: 11.5 K/UL (ref 4–11)

## 2024-04-22 PROCEDURE — G8417 CALC BMI ABV UP PARAM F/U: HCPCS | Performed by: FAMILY MEDICINE

## 2024-04-22 PROCEDURE — 99214 OFFICE O/P EST MOD 30 MIN: CPT | Performed by: FAMILY MEDICINE

## 2024-04-22 PROCEDURE — G8427 DOCREV CUR MEDS BY ELIG CLIN: HCPCS | Performed by: FAMILY MEDICINE

## 2024-04-22 PROCEDURE — 36415 COLL VENOUS BLD VENIPUNCTURE: CPT | Performed by: FAMILY MEDICINE

## 2024-04-22 PROCEDURE — 1036F TOBACCO NON-USER: CPT | Performed by: FAMILY MEDICINE

## 2024-04-22 SDOH — ECONOMIC STABILITY: HOUSING INSECURITY
IN THE LAST 12 MONTHS, WAS THERE A TIME WHEN YOU DID NOT HAVE A STEADY PLACE TO SLEEP OR SLEPT IN A SHELTER (INCLUDING NOW)?: NO

## 2024-04-22 SDOH — ECONOMIC STABILITY: FOOD INSECURITY: WITHIN THE PAST 12 MONTHS, YOU WORRIED THAT YOUR FOOD WOULD RUN OUT BEFORE YOU GOT MONEY TO BUY MORE.: NEVER TRUE

## 2024-04-22 SDOH — ECONOMIC STABILITY: FOOD INSECURITY: WITHIN THE PAST 12 MONTHS, THE FOOD YOU BOUGHT JUST DIDN'T LAST AND YOU DIDN'T HAVE MONEY TO GET MORE.: NEVER TRUE

## 2024-04-22 SDOH — ECONOMIC STABILITY: INCOME INSECURITY: HOW HARD IS IT FOR YOU TO PAY FOR THE VERY BASICS LIKE FOOD, HOUSING, MEDICAL CARE, AND HEATING?: NOT HARD AT ALL

## 2024-04-22 ASSESSMENT — PATIENT HEALTH QUESTIONNAIRE - PHQ9
SUM OF ALL RESPONSES TO PHQ QUESTIONS 1-9: 0
SUM OF ALL RESPONSES TO PHQ QUESTIONS 1-9: 0
SUM OF ALL RESPONSES TO PHQ9 QUESTIONS 1 & 2: 0
1. LITTLE INTEREST OR PLEASURE IN DOING THINGS: NOT AT ALL
SUM OF ALL RESPONSES TO PHQ QUESTIONS 1-9: 0
2. FEELING DOWN, DEPRESSED OR HOPELESS: NOT AT ALL
SUM OF ALL RESPONSES TO PHQ QUESTIONS 1-9: 0

## 2024-04-22 ASSESSMENT — ENCOUNTER SYMPTOMS
GASTROINTESTINAL NEGATIVE: 1
CHEST TIGHTNESS: 1
SHORTNESS OF BREATH: 1
BACK PAIN: 1

## 2024-04-22 NOTE — PROGRESS NOTES
OUTPATIENT PROGRESS NOTE  Date of Service:  4/22/2024  Address: [facility]  [facility]  [address]  [address]  [facility] [address]  Dept: [phone]  Loc: [phone]    Subjective:      Patient ID:  [id number]  Ayanna Calzada is a 34 y.o. female     HPI  Right flank pain  Has had mild pain on and off  No injury  Pain got severe over night and radiated into the front  Woke up and could not breath  Went to the ER  Had xray, ct and blood   all ok  Given nsaid, muscle relaxant  Doing better     Wants to have a recheck of her cbc  Wbc count was elevated        Review of Systems   Constitutional: Negative.    HENT: Negative.     Respiratory:  Positive for chest tightness and shortness of breath.    Cardiovascular: Negative.    Gastrointestinal: Negative.    Genitourinary:  Positive for flank pain (right).   Musculoskeletal:  Positive for back pain.   Neurological: Negative.    Psychiatric/Behavioral:  Positive for decreased concentration.        Objective:   YOB: 1990    Date of Visit:  4/22/2024     No Known Allergies    Outpatient Medications Marked as Taking for the 4/22/24 encounter (Office Visit) with Jamin Bright,    Medication Sig Dispense Refill    naproxen (NAPROSYN) 500 MG tablet Take 1 tablet by mouth 2 times daily (with meals) 60 tablet 5    tiZANidine (ZANAFLEX) 4 MG tablet Take 1 tablet by mouth every 6 hours as needed (muscle aches) 20 tablet 0    ondansetron (ZOFRAN) 4 MG tablet Take 1 tablet by mouth every 8 hours as needed for Nausea 20 tablet 0    albuterol sulfate HFA (VENTOLIN HFA) 108 (90 Base) MCG/ACT inhaler Inhale 2 puffs into the lungs 4 times daily as needed for Wheezing 18 g 0    loratadine (CLARITIN) 10 MG tablet Take 1 tablet by mouth daily 30 tablet 0       Vitals:    04/22/24 1544   BP: 126/80   Weight: 68.8 kg (151 lb 9.6 oz)   Height: 1.499 m (4' 11\")     Body mass index is 30.62 kg/m².     Wt Readings from Last